# Patient Record
Sex: FEMALE | Race: WHITE | NOT HISPANIC OR LATINO | Employment: FULL TIME | URBAN - METROPOLITAN AREA
[De-identification: names, ages, dates, MRNs, and addresses within clinical notes are randomized per-mention and may not be internally consistent; named-entity substitution may affect disease eponyms.]

---

## 2023-11-27 ENCOUNTER — TELEPHONE (OUTPATIENT)
Dept: PRIMARY CARE | Facility: CLINIC | Age: 23
End: 2023-11-27
Payer: COMMERCIAL

## 2023-11-27 DIAGNOSIS — Z91.018 FOOD ALLERGY: ICD-10-CM

## 2023-11-27 RX ORDER — EPINEPHRINE 0.3 MG/.3ML
0.3 INJECTION SUBCUTANEOUS ONCE AS NEEDED
COMMUNITY
Start: 2021-07-14 | End: 2023-11-27 | Stop reason: SDUPTHER

## 2023-11-27 NOTE — TELEPHONE ENCOUNTER
REFILL  MEDICATION:     Epinephrine 0.3 MG/0.3 ML Injection Solution; Inject 0.3 ml intramuscularly as directed.     PHARM: [facility]   PHARM NUMBER: [phone]    LR: 7-8-22   1 each with 0 refills   LV: 7-14-21  NV: 12-27-23

## 2023-11-28 RX ORDER — EPINEPHRINE 0.3 MG/.3ML
0.3 INJECTION SUBCUTANEOUS ONCE AS NEEDED
Qty: 1 EACH | Refills: 0 | Status: SHIPPED | OUTPATIENT
Start: 2023-11-28

## 2023-12-15 PROBLEM — J32.9 SINUSITIS: Status: RESOLVED | Noted: 2023-12-15 | Resolved: 2023-12-15

## 2023-12-15 PROBLEM — R22.0 LIP SWELLING: Status: RESOLVED | Noted: 2023-12-15 | Resolved: 2023-12-15

## 2023-12-15 RX ORDER — DESONIDE 0.5 MG/G
OINTMENT TOPICAL
COMMUNITY
Start: 2023-08-11

## 2023-12-15 RX ORDER — LEVONORGESTREL AND ETHINYL ESTRADIOL 0.1-0.02MG
1 KIT ORAL DAILY
COMMUNITY

## 2023-12-27 ENCOUNTER — OFFICE VISIT (OUTPATIENT)
Dept: PRIMARY CARE | Facility: CLINIC | Age: 23
End: 2023-12-27
Payer: COMMERCIAL

## 2023-12-27 VITALS
WEIGHT: 150 LBS | HEIGHT: 67 IN | SYSTOLIC BLOOD PRESSURE: 102 MMHG | DIASTOLIC BLOOD PRESSURE: 58 MMHG | BODY MASS INDEX: 23.54 KG/M2

## 2023-12-27 DIAGNOSIS — G90.A POTS (POSTURAL ORTHOSTATIC TACHYCARDIA SYNDROME): Primary | ICD-10-CM

## 2023-12-27 DIAGNOSIS — Z91.018 FOOD ALLERGY: ICD-10-CM

## 2023-12-27 DIAGNOSIS — Z23 NEED FOR VACCINATION: ICD-10-CM

## 2023-12-27 PROBLEM — M92.8 JUVENILE OSTEOCHONDROSIS OF FOOT: Status: ACTIVE | Noted: 2020-06-10

## 2023-12-27 PROCEDURE — 99212 OFFICE O/P EST SF 10 MIN: CPT | Performed by: FAMILY MEDICINE

## 2023-12-27 PROCEDURE — 90715 TDAP VACCINE 7 YRS/> IM: CPT | Performed by: FAMILY MEDICINE

## 2023-12-27 PROCEDURE — 90471 IMMUNIZATION ADMIN: CPT | Performed by: FAMILY MEDICINE

## 2023-12-27 PROCEDURE — 1036F TOBACCO NON-USER: CPT | Performed by: FAMILY MEDICINE

## 2023-12-27 ASSESSMENT — PATIENT HEALTH QUESTIONNAIRE - PHQ9
SUM OF ALL RESPONSES TO PHQ9 QUESTIONS 1 AND 2: 0
2. FEELING DOWN, DEPRESSED OR HOPELESS: NOT AT ALL
1. LITTLE INTEREST OR PLEASURE IN DOING THINGS: NOT AT ALL

## 2023-12-27 NOTE — PROGRESS NOTES
"Chief complaint:   Chief Complaint   Patient presents with    Follow-up     Overdue follow up       HPI:  Sarai Garcias is a 23 y.o. female who presents for evaluation and follow up.    She has a food allergy (nut and coconut) for which she has an Epi pen due to hx of anaphylaxis.     She follows with OBGYN and is currently using contraception. She is sexually active.     She has graduated from [employer] in [place]. She works currently in [place].     She has POTS and has followed with neurology. She was dx 2017.    She not longer has issues with migraines    Exercise: work out classes  Tobacco: none   Drugs: none  Alcohol: 1 x weekly     Physical exam:  /58   Ht 1.689 m (5' 6.5\")   Wt 68 kg (150 lb)   BMI 23.85 kg/m²   General: NAD, well appearing female  Heart: RRR, no mumur appreciated  Lungs: CTAB, no wheezes, rales, rhonchi  Abdomen: soft, non tender, normoactive BS, no organomegaly  Extremities: No LE edema    Assessment/Plan   Problem List Items Addressed This Visit       Food allergy     - nut and coconut  - epi pen refilled 11/2023, has not used recently         POTS (postural orthostatic tachycardia syndrome) - Primary     - well controlled with lifestyle          Other Visit Diagnoses       Need for vaccination        Relevant Orders    Tdap vaccine, age 7 years and older  (BOOSTRIX) (Completed)          Post vaccination she got up to check out at the  and felt faint went to get on the floor and was assisted to the room in a chair where she passed out for a few seconds and was lowered to the ground. She was able to remember feeling faint prior to passing out. Her BP was 90/58 when she woke up and improved to 102/58. She felt well and was able to ambulate without sx. Patient advised to lay down with vaccinations in the future.     Ankita De Leon, DO        "